# Patient Record
Sex: FEMALE | Race: WHITE | NOT HISPANIC OR LATINO
[De-identification: names, ages, dates, MRNs, and addresses within clinical notes are randomized per-mention and may not be internally consistent; named-entity substitution may affect disease eponyms.]

---

## 2017-05-22 ENCOUNTER — APPOINTMENT (OUTPATIENT)
Dept: OTOLARYNGOLOGY | Facility: CLINIC | Age: 52
End: 2017-05-22

## 2017-05-22 VITALS
HEIGHT: 64 IN | DIASTOLIC BLOOD PRESSURE: 77 MMHG | SYSTOLIC BLOOD PRESSURE: 109 MMHG | HEART RATE: 97 BPM | BODY MASS INDEX: 34.15 KG/M2 | WEIGHT: 200 LBS

## 2017-05-22 DIAGNOSIS — Z84.1 FAMILY HISTORY OF DISORDERS OF KIDNEY AND URETER: ICD-10-CM

## 2017-05-22 DIAGNOSIS — H93.13 TINNITUS, BILATERAL: ICD-10-CM

## 2017-05-22 DIAGNOSIS — R42 DIZZINESS AND GIDDINESS: ICD-10-CM

## 2017-05-22 DIAGNOSIS — Z82.0 FAMILY HISTORY OF EPILEPSY AND OTHER DISEASES OF THE NERVOUS SYSTEM: ICD-10-CM

## 2017-05-22 DIAGNOSIS — Z78.9 OTHER SPECIFIED HEALTH STATUS: ICD-10-CM

## 2017-05-22 DIAGNOSIS — H93.93 UNSPECIFIED DISORDER OF EAR, BILATERAL: ICD-10-CM

## 2017-05-22 RX ORDER — ALPRAZOLAM 1 MG/1
1 TABLET, EXTENDED RELEASE ORAL
Qty: 60 | Refills: 0 | Status: ACTIVE | COMMUNITY
Start: 2017-01-20

## 2017-05-22 RX ORDER — OFLOXACIN 3 MG/ML
0.3 SOLUTION/ DROPS OPHTHALMIC
Qty: 5 | Refills: 0 | Status: ACTIVE | COMMUNITY
Start: 2017-02-03

## 2017-05-22 RX ORDER — VORTIOXETINE 10 MG/1
10 TABLET, FILM COATED ORAL
Qty: 30 | Refills: 0 | Status: ACTIVE | COMMUNITY
Start: 2017-01-29

## 2017-05-22 RX ORDER — HYDROCODONE POLISTIREX AND CHLORPHENIRAMINE POLISTIREX 10; 8 MG/5ML; MG/5ML
10-8 SUSPENSION, EXTENDED RELEASE ORAL
Qty: 50 | Refills: 0 | Status: ACTIVE | COMMUNITY
Start: 2017-03-05

## 2017-05-22 RX ORDER — SCOPALAMINE 1 MG/3D
1 PATCH, EXTENDED RELEASE TRANSDERMAL
Qty: 10 | Refills: 0 | Status: ACTIVE | COMMUNITY
Start: 2017-02-08

## 2017-05-22 RX ORDER — AZITHROMYCIN 250 MG/1
250 TABLET, FILM COATED ORAL
Qty: 6 | Refills: 0 | Status: ACTIVE | COMMUNITY
Start: 2017-03-05

## 2017-05-22 RX ORDER — BUDESONIDE AND FORMOTEROL FUMARATE DIHYDRATE 160; 4.5 UG/1; UG/1
160-4.5 AEROSOL RESPIRATORY (INHALATION)
Qty: 10 | Refills: 0 | Status: ACTIVE | COMMUNITY
Start: 2017-04-05

## 2017-05-22 RX ORDER — ALBUTEROL SULFATE 90 UG/1
108 (90 BASE) POWDER, METERED RESPIRATORY (INHALATION)
Qty: 1 | Refills: 0 | Status: ACTIVE | COMMUNITY
Start: 2017-03-05

## 2017-05-22 RX ORDER — VORTIOXETINE 20 MG/1
20 TABLET, FILM COATED ORAL
Qty: 30 | Refills: 0 | Status: ACTIVE | COMMUNITY
Start: 2017-05-18

## 2017-05-22 RX ORDER — MECLIZINE HYDROCHLORIDE 25 MG/1
25 TABLET ORAL
Qty: 120 | Refills: 0 | Status: ACTIVE | COMMUNITY
Start: 2017-02-08

## 2017-05-22 RX ORDER — BUDESONIDE AND FORMOTEROL FUMARATE DIHYDRATE 80; 4.5 UG/1; UG/1
80-4.5 AEROSOL RESPIRATORY (INHALATION)
Qty: 10 | Refills: 0 | Status: ACTIVE | COMMUNITY
Start: 2017-03-05

## 2017-07-04 ENCOUNTER — TRANSCRIPTION ENCOUNTER (OUTPATIENT)
Age: 52
End: 2017-07-04

## 2018-07-27 PROBLEM — Z78.9 ALCOHOL USE: Status: ACTIVE | Noted: 2017-05-22

## 2022-12-20 ENCOUNTER — APPOINTMENT (OUTPATIENT)
Dept: ORTHOPEDIC SURGERY | Facility: CLINIC | Age: 57
End: 2022-12-20

## 2022-12-20 VITALS — BODY MASS INDEX: 34.15 KG/M2 | HEIGHT: 64 IN | WEIGHT: 200 LBS

## 2022-12-20 DIAGNOSIS — Z78.9 OTHER SPECIFIED HEALTH STATUS: ICD-10-CM

## 2022-12-20 DIAGNOSIS — M72.0 PALMAR FASCIAL FIBROMATOSIS [DUPUYTREN]: ICD-10-CM

## 2022-12-20 PROCEDURE — 99214 OFFICE O/P EST MOD 30 MIN: CPT

## 2022-12-20 NOTE — HISTORY OF PRESENT ILLNESS
[5] : 5 [4] : 4 [Dull/Aching] : dull/aching [Tingling] : tingling [Ice] : ice [de-identified] : R hand CTS and Dupuytrens [] : no [FreeTextEntry1] : R hand [FreeTextEntry3] : few years ago [FreeTextEntry5] : has N/T. pain has worsen in last few weeks [de-identified] : a month ago [de-identified] : pcp

## 2022-12-20 NOTE — PHYSICAL EXAM
[de-identified] : R hand: \par Tender volar wrist \par Good finger ROM \par +Tinels \par +Phalens \par +Compression test \par Decreased sensation median nerve distribution\par +thenar atrophy\par Dupuytrens nodule no contracture

## 2022-12-29 ENCOUNTER — APPOINTMENT (OUTPATIENT)
Dept: NEUROLOGY | Facility: CLINIC | Age: 57
End: 2022-12-29

## 2022-12-29 DIAGNOSIS — M54.2 CERVICALGIA: ICD-10-CM

## 2022-12-29 PROCEDURE — 95911 NRV CNDJ TEST 9-10 STUDIES: CPT

## 2022-12-29 PROCEDURE — 95886 MUSC TEST DONE W/N TEST COMP: CPT

## 2023-01-05 ENCOUNTER — APPOINTMENT (OUTPATIENT)
Dept: ORTHOPEDIC SURGERY | Facility: CLINIC | Age: 58
End: 2023-01-05
Payer: COMMERCIAL

## 2023-01-05 VITALS — HEIGHT: 64 IN | BODY MASS INDEX: 34.15 KG/M2 | WEIGHT: 200 LBS

## 2023-01-05 DIAGNOSIS — G56.01 CARPAL TUNNEL SYNDROME, RIGHT UPPER LIMB: ICD-10-CM

## 2023-01-05 DIAGNOSIS — M54.12 RADICULOPATHY, CERVICAL REGION: ICD-10-CM

## 2023-01-05 PROCEDURE — J3490M: CUSTOM

## 2023-01-05 PROCEDURE — 20526 THER INJECTION CARP TUNNEL: CPT

## 2023-01-05 PROCEDURE — 99213 OFFICE O/P EST LOW 20 MIN: CPT | Mod: 25

## 2023-01-05 NOTE — HISTORY OF PRESENT ILLNESS
[4] : 4 [3] : 3 [Dull/Aching] : dull/aching [Tingling] : tingling [de-identified] : EMG shows mild R CTS and cerv radic C6/7 [FreeTextEntry1] : right hand  [FreeTextEntry5] : pain is better\par  [FreeTextEntry6] : numbness [de-identified] : no

## 2023-01-05 NOTE — PHYSICAL EXAM
[de-identified] : R hand: \par Tender volar wrist \par Good finger ROM \par +Tinels \par +Phalens \par +Compression test \par Decreased sensation median nerve distribution\par +thenar atrophy\par Dupuytrens nodule no contracture

## 2023-02-02 ENCOUNTER — APPOINTMENT (OUTPATIENT)
Dept: ORTHOPEDIC SURGERY | Facility: CLINIC | Age: 58
End: 2023-02-02